# Patient Record
Sex: MALE | Race: WHITE | NOT HISPANIC OR LATINO
[De-identification: names, ages, dates, MRNs, and addresses within clinical notes are randomized per-mention and may not be internally consistent; named-entity substitution may affect disease eponyms.]

---
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
## 2021-01-12 ENCOUNTER — NON-APPOINTMENT (OUTPATIENT)
Age: 21
End: 2021-01-12

## 2021-01-12 DIAGNOSIS — R74.8 ABNORMAL LEVELS OF OTHER SERUM ENZYMES: ICD-10-CM

## 2021-01-25 ENCOUNTER — APPOINTMENT (OUTPATIENT)
Dept: PEDIATRIC GASTROENTEROLOGY | Facility: CLINIC | Age: 21
End: 2021-01-25
Payer: COMMERCIAL

## 2021-01-25 VITALS
WEIGHT: 139.99 LBS | HEART RATE: 62 BPM | BODY MASS INDEX: 17.97 KG/M2 | DIASTOLIC BLOOD PRESSURE: 74 MMHG | HEIGHT: 74.13 IN | SYSTOLIC BLOOD PRESSURE: 115 MMHG | TEMPERATURE: 97.2 F

## 2021-01-25 PROCEDURE — 99214 OFFICE O/P EST MOD 30 MIN: CPT

## 2021-01-25 PROCEDURE — 99072 ADDL SUPL MATRL&STAF TM PHE: CPT

## 2021-01-25 NOTE — CONSULT LETTER
[Dear  ___] : Dear  [unfilled], [Consult Letter:] : I had the pleasure of evaluating your patient, [unfilled]. [Please see my note below.] : Please see my note below. [Consult Closing:] : Thank you very much for allowing me to participate in the care of this patient.  If you have any questions, please do not hesitate to contact me. [Sincerely,] : Sincerely, [FreeTextEntry3] : Shan Cordon MD\par Division of Pediatric Gastroenterology\par [facility]\par [facility]\par \par

## 2021-01-25 NOTE — ASSESSMENT
[FreeTextEntry1] : 20 yr old male with familial hypercholesterolemia with paternal history of hypercholesterolemia (father currently on medication, also Crestor) and mother with hyperlipidemia s/p 4 stents on Rovastatin. Kendall with hx of elevation of CK which in the past appeared to be related to weight lifting regimen which has since normalized.\par Now with abdominal pain and irregular bowel pattern most suggestive of IBS addy given normal abd CT w/o oral contrast and unremarkable labs however concern with weight loss. \par Discussed transition of care now followed by adult cardiologist. \par \par Plan - review lab and stool results\par Stool PCR pos for Yersinia enterocolitica, now with resolution of symptoms\par lifestyle and diet changes reviewed - disc probiotics, hi fiber diet, peppermint oils (Iberogast, IB-Jennifer), low FODMAP diet\par monitor weight gain\par repeat labs in 6 months\par cont cardiac follow up given family hx\par has not had flu vaccine\par also disc COVID

## 2021-01-25 NOTE — HISTORY OF PRESENT ILLNESS
[de-identified] : 20 year old male with familial hyperlipidemia now with abdominal pain.\par Had been on Crestor 10 mg daily with hx of elevated CK which has resolved. Evaluated by cardiologist who stopped Crestor and plans on observing. \par Now with c/o of abdominal pain which comes and goes, good days and bad days. Was so severe evaluated in ER at [facility] on Jan 18, 2021 where had unremarkable labs and normal CT. Denies N/V but reports early satiety. \par Irregular bowel pattern, skips days with BM every other day. Other times diarrhea once a day. \par No rash or jt pain or muscle pain or weakness. \par Diet hx obtained.  \par Family Hx: father- hyperlipidemia; mother with hyperlipidemia s/p 4 stents; father, sister with IBS. \par

## 2021-01-29 ENCOUNTER — NON-APPOINTMENT (OUTPATIENT)
Age: 21
End: 2021-01-29

## 2021-02-02 ENCOUNTER — NON-APPOINTMENT (OUTPATIENT)
Age: 21
End: 2021-02-02

## 2021-02-03 ENCOUNTER — NON-APPOINTMENT (OUTPATIENT)
Age: 21
End: 2021-02-03

## 2021-02-08 ENCOUNTER — APPOINTMENT (OUTPATIENT)
Dept: MRI IMAGING | Facility: CLINIC | Age: 21
End: 2021-02-08
Payer: COMMERCIAL

## 2021-02-08 ENCOUNTER — RESULT REVIEW (OUTPATIENT)
Age: 21
End: 2021-02-08

## 2021-02-08 ENCOUNTER — OUTPATIENT (OUTPATIENT)
Dept: OUTPATIENT SERVICES | Facility: HOSPITAL | Age: 21
LOS: 1 days | End: 2021-02-08
Payer: COMMERCIAL

## 2021-02-08 DIAGNOSIS — R10.9 UNSPECIFIED ABDOMINAL PAIN: ICD-10-CM

## 2021-02-08 DIAGNOSIS — R19.7 DIARRHEA, UNSPECIFIED: ICD-10-CM

## 2021-02-08 DIAGNOSIS — R63.4 ABNORMAL WEIGHT LOSS: ICD-10-CM

## 2021-02-08 PROCEDURE — 72197 MRI PELVIS W/O & W/DYE: CPT

## 2021-02-08 PROCEDURE — 72197 MRI PELVIS W/O & W/DYE: CPT | Mod: 26

## 2021-02-08 PROCEDURE — 74183 MRI ABD W/O CNTR FLWD CNTR: CPT

## 2021-02-08 PROCEDURE — 74183 MRI ABD W/O CNTR FLWD CNTR: CPT | Mod: 26

## 2021-02-08 PROCEDURE — A9585: CPT

## 2021-02-11 ENCOUNTER — NON-APPOINTMENT (OUTPATIENT)
Age: 21
End: 2021-02-11

## 2021-03-01 ENCOUNTER — APPOINTMENT (OUTPATIENT)
Dept: DISASTER EMERGENCY | Facility: CLINIC | Age: 21
End: 2021-03-01

## 2021-03-01 DIAGNOSIS — Z01.818 ENCOUNTER FOR OTHER PREPROCEDURAL EXAMINATION: ICD-10-CM

## 2021-03-02 LAB — SARS-COV-2 N GENE NPH QL NAA+PROBE: NOT DETECTED

## 2021-03-03 ENCOUNTER — TRANSCRIPTION ENCOUNTER (OUTPATIENT)
Age: 21
End: 2021-03-03

## 2021-03-03 ENCOUNTER — RESULT REVIEW (OUTPATIENT)
Age: 21
End: 2021-03-03

## 2021-03-04 ENCOUNTER — OUTPATIENT (OUTPATIENT)
Dept: OUTPATIENT SERVICES | Age: 21
LOS: 1 days | Discharge: HOME | End: 2021-03-04
Payer: COMMERCIAL

## 2021-03-04 ENCOUNTER — NON-APPOINTMENT (OUTPATIENT)
Age: 21
End: 2021-03-04

## 2021-03-04 VITALS
DIASTOLIC BLOOD PRESSURE: 71 MMHG | OXYGEN SATURATION: 100 % | RESPIRATION RATE: 16 BRPM | SYSTOLIC BLOOD PRESSURE: 108 MMHG | HEART RATE: 68 BPM

## 2021-03-04 VITALS
TEMPERATURE: 98 F | HEIGHT: 74.02 IN | DIASTOLIC BLOOD PRESSURE: 72 MMHG | SYSTOLIC BLOOD PRESSURE: 99 MMHG | OXYGEN SATURATION: 100 % | RESPIRATION RATE: 18 BRPM | HEART RATE: 84 BPM | WEIGHT: 135.14 LBS

## 2021-03-04 DIAGNOSIS — R10.9 UNSPECIFIED ABDOMINAL PAIN: ICD-10-CM

## 2021-03-04 PROCEDURE — 45330 DIAGNOSTIC SIGMOIDOSCOPY: CPT

## 2021-03-04 PROCEDURE — 43239 EGD BIOPSY SINGLE/MULTIPLE: CPT

## 2021-03-04 PROCEDURE — 88305 TISSUE EXAM BY PATHOLOGIST: CPT | Mod: 26

## 2021-03-04 NOTE — ASU DISCHARGE PLAN (ADULT/PEDIATRIC) - CALL YOUR DOCTOR IF YOU HAVE ANY OF THE FOLLOWING:
Bleeding that does not stop/Fever greater than (need to indicate Fahrenheit or Celsius)/Inability to tolerate liquids or foods

## 2021-03-04 NOTE — ASU DISCHARGE PLAN (ADULT/PEDIATRIC) - CARE PROVIDER_API CALL
Shan Cordon)  Pediatric Gastroenterology  [address]  [place] [address]  Phone: [phone]  Fax: [phone]  Follow Up Time:

## 2021-03-06 LAB — SURGICAL PATHOLOGY STUDY: SIGNIFICANT CHANGE UP

## 2021-03-07 LAB
B-GALACTOSIDASE TISS-CCNT: 219.9 U/G — SIGNIFICANT CHANGE UP
DISACCHARIDASES TSMI-IMP: SIGNIFICANT CHANGE UP
ISOMALTASE TISS-CCNT: 23.1 U/G — SIGNIFICANT CHANGE UP
PALATINASE TISS-CCNT: 57.9 U/G — SIGNIFICANT CHANGE UP
SUCRASE TISS-CCNT: 17.4 U/G — SIGNIFICANT CHANGE UP

## 2021-03-09 ENCOUNTER — NON-APPOINTMENT (OUTPATIENT)
Age: 21
End: 2021-03-09

## 2021-03-25 ENCOUNTER — NON-APPOINTMENT (OUTPATIENT)
Age: 21
End: 2021-03-25

## 2021-03-29 ENCOUNTER — APPOINTMENT (OUTPATIENT)
Dept: DISASTER EMERGENCY | Facility: CLINIC | Age: 21
End: 2021-03-29

## 2021-03-31 ENCOUNTER — TRANSCRIPTION ENCOUNTER (OUTPATIENT)
Age: 21
End: 2021-03-31

## 2021-03-31 LAB — SARS-COV-2 N GENE NPH QL NAA+PROBE: NOT DETECTED

## 2021-04-01 ENCOUNTER — OUTPATIENT (OUTPATIENT)
Dept: OUTPATIENT SERVICES | Age: 21
LOS: 1 days | Discharge: HOME | End: 2021-04-01
Payer: COMMERCIAL

## 2021-04-01 ENCOUNTER — RESULT REVIEW (OUTPATIENT)
Age: 21
End: 2021-04-01

## 2021-04-01 VITALS
DIASTOLIC BLOOD PRESSURE: 63 MMHG | HEIGHT: 72.83 IN | HEART RATE: 73 BPM | RESPIRATION RATE: 20 BRPM | OXYGEN SATURATION: 98 % | SYSTOLIC BLOOD PRESSURE: 97 MMHG | TEMPERATURE: 98 F | WEIGHT: 134.48 LBS

## 2021-04-01 VITALS
RESPIRATION RATE: 20 BRPM | DIASTOLIC BLOOD PRESSURE: 69 MMHG | HEART RATE: 45 BPM | SYSTOLIC BLOOD PRESSURE: 106 MMHG | OXYGEN SATURATION: 100 %

## 2021-04-01 DIAGNOSIS — R10.9 UNSPECIFIED ABDOMINAL PAIN: ICD-10-CM

## 2021-04-01 PROCEDURE — 88305 TISSUE EXAM BY PATHOLOGIST: CPT | Mod: 26

## 2021-04-01 PROCEDURE — 45380 COLONOSCOPY AND BIOPSY: CPT

## 2021-04-01 PROCEDURE — 43239 EGD BIOPSY SINGLE/MULTIPLE: CPT

## 2021-04-01 NOTE — ASU DISCHARGE PLAN (ADULT/PEDIATRIC) - CARE PROVIDER_API CALL
Shan Cordon)  Pediatric Gastroenterology  [address]  [place] [address]  Phone: [phone]  Fax: [phone]  Follow Up Time:

## 2021-04-01 NOTE — ASU PATIENT PROFILE, PEDIATRIC - LOW RISK FALLS INTERVENTIONS (SCORE 7-11)
Orientation to room/Side rails x 2 or 4 up, assess large gaps, such that a patient could get extremity or other body part entrapped, use additional safety procedures/Assess for adequate lighting, leave nightlight on/Patient and family education available to parents and patient/Document fall prevention teaching and include in plan of care

## 2021-04-02 LAB
25(OH)D3 SERPL-MCNC: 20.3 NG/ML
ALBUMIN SERPL ELPH-MCNC: 5 G/DL
ALP BLD-CCNC: 95 U/L
ALT SERPL-CCNC: 21 U/L
ANION GAP SERPL CALC-SCNC: 22 MMOL/L
AST SERPL-CCNC: 24 U/L
BASOPHILS # BLD AUTO: 0.03 K/UL
BASOPHILS NFR BLD AUTO: 0.6 %
BILIRUB SERPL-MCNC: 1.5 MG/DL
BUN SERPL-MCNC: 20 MG/DL
CALCIUM SERPL-MCNC: 9.2 MG/DL
CHLORIDE SERPL-SCNC: 100 MMOL/L
CO2 SERPL-SCNC: 16 MMOL/L
CREAT SERPL-MCNC: 0.91 MG/DL
CRP SERPL-MCNC: <3 MG/L
EOSINOPHIL # BLD AUTO: 0.01 K/UL
EOSINOPHIL NFR BLD AUTO: 0.2 %
FERRITIN SERPL-MCNC: 326 NG/ML
FOLATE SERPL-MCNC: 18.3 NG/ML
GLUCOSE SERPL-MCNC: 62 MG/DL
HCT VFR BLD CALC: 37.6 %
HGB BLD-MCNC: 13 G/DL
IMM GRANULOCYTES NFR BLD AUTO: 0 %
IRON SATN MFR SERPL: 32 %
IRON SERPL-MCNC: 96 UG/DL
LYMPHOCYTES # BLD AUTO: 1.5 K/UL
LYMPHOCYTES NFR BLD AUTO: 30.3 %
MAN DIFF?: NORMAL
MCHC RBC-ENTMCNC: 31.3 PG
MCHC RBC-ENTMCNC: 34.6 GM/DL
MCV RBC AUTO: 90.4 FL
MONOCYTES # BLD AUTO: 0.34 K/UL
MONOCYTES NFR BLD AUTO: 6.9 %
NEUTROPHILS # BLD AUTO: 3.07 K/UL
NEUTROPHILS NFR BLD AUTO: 62 %
PLATELET # BLD AUTO: 179 K/UL
POTASSIUM SERPL-SCNC: 4.9 MMOL/L
PROT SERPL-MCNC: 6.9 G/DL
RBC # BLD: 4.16 M/UL
RBC # FLD: 11.9 %
SODIUM SERPL-SCNC: 139 MMOL/L
SURGICAL PATHOLOGY STUDY: SIGNIFICANT CHANGE UP
TIBC SERPL-MCNC: 297 UG/DL
UIBC SERPL-MCNC: 201 UG/DL
VIT B12 SERPL-MCNC: 834 PG/ML
WBC # FLD AUTO: 4.95 K/UL

## 2021-04-02 PROCEDURE — 91110 GI TRC IMG INTRAL ESOPH-ILE: CPT | Mod: 26

## 2021-04-07 ENCOUNTER — NON-APPOINTMENT (OUTPATIENT)
Age: 21
End: 2021-04-07

## 2021-04-08 ENCOUNTER — APPOINTMENT (OUTPATIENT)
Dept: RADIOLOGY | Facility: CLINIC | Age: 21
End: 2021-04-08
Payer: COMMERCIAL

## 2021-04-08 ENCOUNTER — OUTPATIENT (OUTPATIENT)
Dept: OUTPATIENT SERVICES | Facility: HOSPITAL | Age: 21
LOS: 1 days | End: 2021-04-08
Payer: COMMERCIAL

## 2021-04-08 DIAGNOSIS — R10.9 UNSPECIFIED ABDOMINAL PAIN: ICD-10-CM

## 2021-04-08 DIAGNOSIS — Z00.8 ENCOUNTER FOR OTHER GENERAL EXAMINATION: ICD-10-CM

## 2021-04-08 DIAGNOSIS — T18.3XXA FOREIGN BODY IN SMALL INTESTINE, INITIAL ENCOUNTER: ICD-10-CM

## 2021-04-08 LAB — YERSINIA AB TITR SER: NORMAL

## 2021-04-08 PROCEDURE — 74018 RADEX ABDOMEN 1 VIEW: CPT | Mod: 26

## 2021-04-08 PROCEDURE — 74018 RADEX ABDOMEN 1 VIEW: CPT

## 2021-04-09 ENCOUNTER — NON-APPOINTMENT (OUTPATIENT)
Age: 21
End: 2021-04-09

## 2021-04-19 ENCOUNTER — APPOINTMENT (OUTPATIENT)
Dept: PEDIATRIC GASTROENTEROLOGY | Facility: CLINIC | Age: 21
End: 2021-04-19
Payer: COMMERCIAL

## 2021-04-19 VITALS
SYSTOLIC BLOOD PRESSURE: 101 MMHG | DIASTOLIC BLOOD PRESSURE: 67 MMHG | TEMPERATURE: 97.7 F | BODY MASS INDEX: 17.12 KG/M2 | HEART RATE: 52 BPM | HEIGHT: 74.17 IN | WEIGHT: 133.38 LBS

## 2021-04-19 DIAGNOSIS — Z87.898 PERSONAL HISTORY OF OTHER SPECIFIED CONDITIONS: ICD-10-CM

## 2021-04-19 DIAGNOSIS — T18.3XXA FOREIGN BODY IN SMALL INTESTINE, INITIAL ENCOUNTER: ICD-10-CM

## 2021-04-19 DIAGNOSIS — E55.9 VITAMIN D DEFICIENCY, UNSPECIFIED: ICD-10-CM

## 2021-04-19 PROCEDURE — 99072 ADDL SUPL MATRL&STAF TM PHE: CPT

## 2021-04-19 PROCEDURE — 99214 OFFICE O/P EST MOD 30 MIN: CPT

## 2021-04-19 RX ORDER — KETOCONAZOLE 20 MG/G
2 CREAM TOPICAL
Qty: 60 | Refills: 0 | Status: COMPLETED | COMMUNITY
Start: 2020-11-17

## 2021-04-19 NOTE — CONSULT LETTER
[Dear  ___] : Dear  [unfilled], [Consult Letter:] : I had the pleasure of evaluating your patient, [unfilled]. [Please see my note below.] : Please see my note below. [Consult Closing:] : Thank you very much for allowing me to participate in the care of this patient.  If you have any questions, please do not hesitate to contact me. [Sincerely,] : Sincerely, [FreeTextEntry3] : Shan Cordon MD\par Division of Pediatric Gastroenterology\par [facility]\par [facility]\par \par

## 2021-04-19 NOTE — ASSESSMENT
[FreeTextEntry1] : 20 yr old male with familial hypercholesterolemia with paternal history of hypercholesterolemia (father currently on medication, also Crestor) and mother with hyperlipidemia s/p 4 stents on Rovastatin now off meds. Active issue remains abdominal pain with bloating, poor appetite and continued weight loss. History of stool PCR pos for Yersinia with duodenitis but Yersinia titres with neg Ab making infection unlikely cause for ongoing symptomatology. Also TI unremarkable with unremarkable biopsies. Concern remains regarding duodenal involvement with duodenitis and possible narrowing as well as jejunal enhancement. \par \par Plan for push enteroscopy\par resume Omeprazole\par replace water with Nutritional supplements - Boost and Ensure led to discomfort\par rec trial Octane Lending\par disc probiotic - trialed Align and Culturelle without improvement\par close monitoring

## 2021-04-19 NOTE — HISTORY OF PRESENT ILLNESS
[de-identified] : 21 yo male with familial hyperlipidemia, off meds (was on Crestor) with abd pain, wt loss, early satiety and irregular bowel pattern and hx of stool PCR pos for Yersinia with duodenitis\par Continues with abd discomfort, bloating, weight loss.\par Denies N/V.  BMs variable up to 4x/d, soft and formed. No blood or diarrhea. \par Nl abd CT and labs at [place], CT enterography with jejunal enhancement and narrowing but underdistended\par MRE Feb 2021 normal except for inc gas and stool\par EGD March 4 with neg disaccharidase and duodenum with focal active duodenitis with attenuated villi/erosion, neutrophilic infiltration and reactive epi mucin depletion\par Colonoscopy to sigmoid with large amount of stool\par Started on Omeprazole. \par EGD and colon April 1, 2021 grossly WNL except for some blood in stomach and stool in colon. Pathology was unremarkable. \par capsule placed in duodenum with reflux back into the stomach, duodenal ulcerations and narrowing\par Stool for Yersinia was pos but Yersinia titres neg\par Family Hx: father- hyperlipidemia; mother with hyperlipidemia s/p 4 stents; father, sister with IBS. \par Now followed by adult cardiology

## 2021-04-19 NOTE — PHYSICAL EXAM
[Well Developed] : well developed [NAD] : in no acute distress [Thin] : thin [PERRL] : pupils were equal, round, reactive to light  [icteric] : anicteric [Moist & Pink Mucous Membranes] : moist and pink mucous membranes [CTAB] : lungs clear to auscultation bilaterally [Respiratory Distress] : no respiratory distress  [Regular Rate and Rhythm] : regular rate and rhythm [Normal S1, S2] : normal S1 and S2 [Soft] : soft  [Distended] : non distended [Tender] : non tender [Normal Bowel Sounds] : normal bowel sounds [No HSM] : no hepatosplenomegaly appreciated [Normal Tone] : normal tone [Well-Perfused] : well-perfused [Edema] : no edema [Cyanosis] : no cyanosis [Rash] : no rash [Jaundice] : no jaundice [Interactive] : interactive

## 2021-04-23 ENCOUNTER — APPOINTMENT (OUTPATIENT)
Dept: DISASTER EMERGENCY | Facility: CLINIC | Age: 21
End: 2021-04-23

## 2021-04-24 LAB — SARS-COV-2 N GENE NPH QL NAA+PROBE: NOT DETECTED

## 2021-04-25 ENCOUNTER — TRANSCRIPTION ENCOUNTER (OUTPATIENT)
Age: 21
End: 2021-04-25

## 2021-04-26 ENCOUNTER — RESULT REVIEW (OUTPATIENT)
Age: 21
End: 2021-04-26

## 2021-04-26 ENCOUNTER — OUTPATIENT (OUTPATIENT)
Dept: OUTPATIENT SERVICES | Age: 21
LOS: 1 days | Discharge: HOME | End: 2021-04-26
Payer: COMMERCIAL

## 2021-04-26 VITALS
DIASTOLIC BLOOD PRESSURE: 74 MMHG | RESPIRATION RATE: 18 BRPM | HEART RATE: 50 BPM | OXYGEN SATURATION: 100 % | SYSTOLIC BLOOD PRESSURE: 101 MMHG

## 2021-04-26 VITALS
TEMPERATURE: 98 F | HEIGHT: 73.23 IN | WEIGHT: 133.82 LBS | SYSTOLIC BLOOD PRESSURE: 99 MMHG | RESPIRATION RATE: 18 BRPM | HEART RATE: 65 BPM | OXYGEN SATURATION: 96 % | DIASTOLIC BLOOD PRESSURE: 67 MMHG

## 2021-04-26 DIAGNOSIS — Z90.89 ACQUIRED ABSENCE OF OTHER ORGANS: Chronic | ICD-10-CM

## 2021-04-26 DIAGNOSIS — R10.9 UNSPECIFIED ABDOMINAL PAIN: ICD-10-CM

## 2021-04-26 PROCEDURE — 88305 TISSUE EXAM BY PATHOLOGIST: CPT | Mod: 26

## 2021-04-26 PROCEDURE — 44361 SMALL BOWEL ENDOSCOPY/BIOPSY: CPT

## 2021-04-26 NOTE — ASU DISCHARGE PLAN (ADULT/PEDIATRIC) - CALL YOUR DOCTOR IF YOU HAVE ANY OF THE FOLLOWING:
Bleeding that does not stop/Swelling that gets worse/Pain not relieved by Medications/Fever greater than (need to indicate Fahrenheit or Celsius)/Nausea and vomiting that does not stop/Excessive diarrhea

## 2021-04-26 NOTE — ASU DISCHARGE PLAN (ADULT/PEDIATRIC) - CARE PROVIDER_API CALL
Shan Cordon)  Pediatric Gastroenterology  [address]  [place] [address]  Phone: [phone]  Fax: [phone]  Established Patient  Follow Up Time:

## 2021-04-28 LAB — SURGICAL PATHOLOGY STUDY: SIGNIFICANT CHANGE UP

## 2021-05-03 ENCOUNTER — APPOINTMENT (OUTPATIENT)
Dept: PEDIATRIC GASTROENTEROLOGY | Facility: CLINIC | Age: 21
End: 2021-05-03
Payer: COMMERCIAL

## 2021-05-03 VITALS
BODY MASS INDEX: 17.57 KG/M2 | TEMPERATURE: 98.3 F | WEIGHT: 136.91 LBS | SYSTOLIC BLOOD PRESSURE: 115 MMHG | HEIGHT: 74.09 IN | DIASTOLIC BLOOD PRESSURE: 75 MMHG | HEART RATE: 67 BPM

## 2021-05-03 DIAGNOSIS — K29.80 DUODENITIS W/OUT BLEEDING: ICD-10-CM

## 2021-05-03 DIAGNOSIS — A04.6 ENTERITIS DUE TO YERSINIA ENTEROCOLITICA: ICD-10-CM

## 2021-05-03 DIAGNOSIS — R14.0 ABDOMINAL DISTENSION (GASEOUS): ICD-10-CM

## 2021-05-03 DIAGNOSIS — E78.00 PURE HYPERCHOLESTEROLEMIA, UNSPECIFIED: ICD-10-CM

## 2021-05-03 DIAGNOSIS — R10.9 UNSPECIFIED ABDOMINAL PAIN: ICD-10-CM

## 2021-05-03 DIAGNOSIS — R63.4 ABNORMAL WEIGHT LOSS: ICD-10-CM

## 2021-05-03 PROCEDURE — 99214 OFFICE O/P EST MOD 30 MIN: CPT

## 2021-05-03 PROCEDURE — 99072 ADDL SUPL MATRL&STAF TM PHE: CPT

## 2021-05-03 RX ORDER — OMEPRAZOLE 20 MG/1
20 CAPSULE, DELAYED RELEASE ORAL DAILY
Qty: 30 | Refills: 2 | Status: DISCONTINUED | COMMUNITY
Start: 2021-03-09 | End: 2021-05-03

## 2021-05-03 NOTE — ASSESSMENT
[FreeTextEntry1] : 20 yr old male with familial hypercholesterolemia with paternal history of hypercholesterolemia (father currently on medication, also Crestor) and mother with hyperlipidemia s/p 4 stents on Rovastatin now off meds. Active issue remain abdominal pain with bloating, poor appetite and weight loss currently with improvement in appetite and discomfort with documented weight gain. History of stool PCR pos for Yersinia with duodenitis but Yersinia titres with neg Ab so unclear if infection was cause for initial symptoms and now with improving post-infectious IBS.  Also TI unremarkable with unremarkable biopsies. Although some concern given hx of duodenal involvement with duodenitis and possible narrowing as well as jejunal enhancement, further investigation with push enteroscopy was unremarkable.  \par \par Plan d/c Omeprazole\par encourage oral intake\par monitor weight gain, clinical status\par cont peppermint Yogi tea\par disc probiotic - trialed Align and Culturelle without improvement so wants to hold off at this time\par f/u with cardiology regarding hyperlipidemia\par f/u appt in 6 months, sooner if clinically indicated.  \par

## 2021-05-03 NOTE — CONSULT LETTER
[Dear  ___] : Dear  [unfilled], [Consult Letter:] : I had the pleasure of evaluating your patient, [unfilled]. [Please see my note below.] : Please see my note below. [Consult Closing:] : Thank you very much for allowing me to participate in the care of this patient.  If you have any questions, please do not hesitate to contact me. [Sincerely,] : Sincerely, [FreeTextEntry3] : Shan Cordon MD\par Division of Pediatric Gastroenterology\par [facility]\par [facility]\par \par

## 2021-05-03 NOTE — PHYSICAL EXAM
[Well Developed] : well developed [NAD] : in no acute distress [PERRL] : pupils were equal, round, reactive to light  [icteric] : anicteric [Moist & Pink Mucous Membranes] : moist and pink mucous membranes [CTAB] : lungs clear to auscultation bilaterally [Respiratory Distress] : no respiratory distress  [Regular Rate and Rhythm] : regular rate and rhythm [Normal S1, S2] : normal S1 and S2 [Soft] : soft  [Distended] : non distended [Tender] : non tender [Normal Bowel Sounds] : normal bowel sounds [No HSM] : no hepatosplenomegaly appreciated [Normal Tone] : normal tone [Well-Perfused] : well-perfused [Edema] : no edema [Cyanosis] : no cyanosis [Rash] : no rash [Jaundice] : no jaundice [Interactive] : interactive [FreeTextEntry3] : scar on left cheek

## 2021-05-03 NOTE — HISTORY OF PRESENT ILLNESS
[de-identified] : 21 yo male with familial hyperlipidemia, off meds (was on Crestor) with abd pain, wt loss, early satiety and irregular bowel pattern and hx of stool PCR pos for Yersinia with duodenitis.\par Since last visit had push enteroscopy by Dr. Hickey on April 26 which was unremarkable. \par Now with improved appetite, limiting diet. Drinks water and Yogi tea with fennel, licorice and peppermint.\par Some abd discomfort and bloating as day progresses but overall improved. \par Denies N/V. BMs now daily, formed. No blood or diarrhea. \par Nl abd CT and labs at [place], CT enterography with jejunal enhancement and narrowing but underdistended\par MRE Feb 2021 normal except for inc gas and stool\par EGD March 4 with neg disaccharidase and duodenum with focal active duodenitis with attenuated villi/erosion, neutrophilic infiltration and reactive epi mucin depletion\par Colonoscopy to sigmoid with large amount of stool\par Started on Omeprazole. \par EGD and colon April 1, 2021 grossly WNL except for some blood in stomach and stool in colon. Pathology was unremarkable. \par capsule placed in duodenum with reflux back into the stomach, duodenal ulcerations and narrowing\par April 26, 2021-unremarkable push enteroscopy\par Stool for Yersinia was pos but Yersinia titres neg\par Family Hx: father- hyperlipidemia; mother with hyperlipidemia s/p 4 stents; father, sister with IBS. \par Now followed by adult cardiology. \par
